# Patient Record
Sex: FEMALE | Race: WHITE
[De-identification: names, ages, dates, MRNs, and addresses within clinical notes are randomized per-mention and may not be internally consistent; named-entity substitution may affect disease eponyms.]

---

## 2019-03-25 ENCOUNTER — HOSPITAL ENCOUNTER (OUTPATIENT)
Dept: HOSPITAL 63 - US | Age: 24
Discharge: HOME | End: 2019-03-25
Attending: FAMILY MEDICINE
Payer: COMMERCIAL

## 2019-03-25 DIAGNOSIS — N61.0: Primary | ICD-10-CM

## 2019-03-25 DIAGNOSIS — N63.11: ICD-10-CM

## 2019-03-25 PROCEDURE — 76641 ULTRASOUND BREAST COMPLETE: CPT

## 2019-03-25 NOTE — RAD
Right breast ultrasound, 3/25/2019:



History: Mastitis, possible abscess



The right breast was carefully scanned.  There is vague decreased echogenicity

in the upper outer quadrant corresponding to the area of pain.  This extends

over an area measuring at least 3 cm.  No discrete fluid collection is seen to

suggest a drainable abscess.  



There is a 1.1 cm lobulated hypervascular nodule at the 9:00 location,

approximately 5 cm from the nipple, compatible with an intramammary lymph

node.



A 14 x 7 mm hypoechoic nodule in the right axilla is probably a reactive lymph

node.





IMPRESSION:

1.  Ill-defined hypoechoic process in the upper outer right breast compatible

with the clinical impression of mastitis.  No discrete abscess is seen.

2.  Small adjacent intramammary lymph node at the 9:00 location.

3.  Small right axillary lymph node which is probably reactive.

4.  Underlying neoplasm cannot be excluded.  Clinical and imaging follow-up is

suggested.

## 2022-01-11 ENCOUNTER — APPOINTMENT (RX ONLY)
Dept: URBAN - METROPOLITAN AREA CLINIC 139 | Facility: CLINIC | Age: 27
Setting detail: DERMATOLOGY
End: 2022-01-11

## 2022-01-11 DIAGNOSIS — L71.8 OTHER ROSACEA: ICD-10-CM

## 2022-01-11 DIAGNOSIS — B07.8 OTHER VIRAL WARTS: ICD-10-CM

## 2022-01-11 PROBLEM — D48.5 NEOPLASM OF UNCERTAIN BEHAVIOR OF SKIN: Status: ACTIVE | Noted: 2022-01-11

## 2022-01-11 PROCEDURE — ? BIOPSY BY SHAVE METHOD

## 2022-01-11 PROCEDURE — 99203 OFFICE O/P NEW LOW 30 MIN: CPT | Mod: 25

## 2022-01-11 PROCEDURE — ? COUNSELING

## 2022-01-11 PROCEDURE — ? PRESCRIPTION

## 2022-01-11 PROCEDURE — 11102 TANGNTL BX SKIN SINGLE LES: CPT

## 2022-01-11 RX ORDER — METRONIDAZOLE 7.5 MG/G
CREAM TOPICAL
Qty: 45 | Refills: 3 | Status: ERX | COMMUNITY
Start: 2022-01-11

## 2022-01-11 RX ADMIN — METRONIDAZOLE: 7.5 CREAM TOPICAL at 00:00

## 2022-01-11 ASSESSMENT — LOCATION DETAILED DESCRIPTION DERM
LOCATION DETAILED: RIGHT INFERIOR CENTRAL MALAR CHEEK
LOCATION DETAILED: RIGHT POSTERIOR LATERAL PROXIMAL THIGH

## 2022-01-11 ASSESSMENT — LOCATION ZONE DERM
LOCATION ZONE: LEG
LOCATION ZONE: FACE

## 2022-01-11 ASSESSMENT — LOCATION SIMPLE DESCRIPTION DERM
LOCATION SIMPLE: RIGHT THIGH
LOCATION SIMPLE: RIGHT CHEEK

## 2022-01-11 NOTE — PROCEDURE: BIOPSY BY SHAVE METHOD
Detail Level: Detailed
Depth Of Biopsy: dermis
Was A Bandage Applied: Yes
Size Of Lesion In Cm: 0.8
X Size Of Lesion In Cm: 0
Biopsy Type: H and E
Biopsy Method: Personna blade
Anesthesia Type: 2% lidocaine with epinephrine
Anesthesia Volume In Cc (Will Not Render If 0): 0.5
Hemostasis: Electrocautery and Aluminum Chloride
Wound Care: Vaseline
Dressing: pressure dressing
Destruction After The Procedure: No
Type Of Destruction Used: Curettage
Curettage Text: The wound bed was treated with curettage after the biopsy was performed.
Cryotherapy Text: The wound bed was treated with cryotherapy after the biopsy was performed.
Electrodesiccation Text: The wound bed was treated with electrodesiccation after the biopsy was performed.
Electrodesiccation And Curettage Text: The wound bed was treated with electrodesiccation and curettage after the biopsy was performed.
Silver Nitrate Text: The wound bed was treated with silver nitrate after the biopsy was performed.
Lab: [id number]
Lab Facility: [id number]
Post-Care Instructions: Instructions were given to patient. Patient to call if he has not heard results within 2 weeks.
Billing Type: Third-Party Bill
Information: Selecting Yes will display possible errors in your note based on the variables you have selected. This validation is only offered as a suggestion for you. PLEASE NOTE THAT THE VALIDATION TEXT WILL BE REMOVED WHEN YOU FINALIZE YOUR NOTE. IF YOU WANT TO FAX A PRELIMINARY NOTE YOU WILL NEED TO TOGGLE THIS TO 'NO' IF YOU DO NOT WANT IT IN YOUR FAXED NOTE.

## 2022-05-10 ENCOUNTER — APPOINTMENT (RX ONLY)
Dept: URBAN - METROPOLITAN AREA CLINIC 139 | Facility: CLINIC | Age: 27
Setting detail: DERMATOLOGY
End: 2022-05-10

## 2022-05-10 DIAGNOSIS — L71.8 OTHER ROSACEA: ICD-10-CM

## 2022-05-10 PROCEDURE — ? PRESCRIPTION

## 2022-05-10 PROCEDURE — 99213 OFFICE O/P EST LOW 20 MIN: CPT

## 2022-05-10 PROCEDURE — ? COUNSELING

## 2022-05-10 PROCEDURE — ? OTHER

## 2022-05-10 RX ORDER — OXYMETAZOLINE HYDROCHLORIDE 1 G/100G
CREAM TOPICAL QD
Qty: 30 | Refills: 2 | Status: ERX | COMMUNITY
Start: 2022-05-10

## 2022-05-10 RX ADMIN — OXYMETAZOLINE HYDROCHLORIDE: 1 CREAM TOPICAL at 00:00

## 2022-05-10 ASSESSMENT — LOCATION ZONE DERM
LOCATION ZONE: FACE
LOCATION ZONE: FACE

## 2022-05-10 ASSESSMENT — LOCATION SIMPLE DESCRIPTION DERM
LOCATION SIMPLE: RIGHT CHEEK
LOCATION SIMPLE: LEFT CHEEK
LOCATION SIMPLE: RIGHT CHEEK
LOCATION SIMPLE: LEFT CHEEK

## 2022-05-10 ASSESSMENT — LOCATION DETAILED DESCRIPTION DERM
LOCATION DETAILED: LEFT CENTRAL MALAR CHEEK
LOCATION DETAILED: RIGHT CENTRAL MALAR CHEEK
LOCATION DETAILED: RIGHT CENTRAL MALAR CHEEK
LOCATION DETAILED: LEFT INFERIOR CENTRAL MALAR CHEEK
LOCATION DETAILED: LEFT SUPERIOR CENTRAL BUCCAL CHEEK
LOCATION DETAILED: RIGHT SUPERIOR MEDIAL BUCCAL CHEEK
LOCATION DETAILED: LEFT SUPERIOR CENTRAL BUCCAL CHEEK
LOCATION DETAILED: RIGHT SUPERIOR MEDIAL BUCCAL CHEEK

## 2022-05-10 NOTE — PROCEDURE: OTHER
Detail Level: Zone
Note Text (......Xxx Chief Complaint.): This diagnosis correlates with the
Render Risk Assessment In Note?: no
Other (Free Text): Discussed antibiotics, prescriptions like rhofade or mervaso, and seeing our aestheticians Arely and Ministerio for laser treatment. Patient is breast feeding so rhofade is okay to use if not covered by insurance azelaic acid or Finacea. Patient given good RX card for rhofade if not covered by insurance.

## 2022-10-27 ENCOUNTER — APPOINTMENT (RX ONLY)
Dept: URBAN - METROPOLITAN AREA CLINIC 139 | Facility: CLINIC | Age: 27
Setting detail: DERMATOLOGY
End: 2022-10-27

## 2022-10-27 DIAGNOSIS — L71.8 OTHER ROSACEA: ICD-10-CM

## 2022-10-27 PROCEDURE — ? COUNSELING

## 2022-10-27 PROCEDURE — ? OTHER

## 2022-10-27 PROCEDURE — 99212 OFFICE O/P EST SF 10 MIN: CPT

## 2022-10-27 ASSESSMENT — LOCATION SIMPLE DESCRIPTION DERM
LOCATION SIMPLE: RIGHT CHEEK
LOCATION SIMPLE: LEFT CHEEK

## 2022-10-27 ASSESSMENT — LOCATION DETAILED DESCRIPTION DERM
LOCATION DETAILED: RIGHT INFERIOR CENTRAL MALAR CHEEK
LOCATION DETAILED: LEFT INFERIOR CENTRAL MALAR CHEEK

## 2022-10-27 ASSESSMENT — LOCATION ZONE DERM: LOCATION ZONE: FACE

## 2022-10-27 NOTE — PROCEDURE: OTHER
Other (Free Text): Patient elects to consult with Galen to discuss laser treatment.  Patient would also like to discuss blending of dark spots.
Detail Level: Zone
Render Risk Assessment In Note?: no
Note Text (......Xxx Chief Complaint.): This diagnosis correlates with the

## 2022-12-20 ENCOUNTER — APPOINTMENT (RX ONLY)
Dept: URBAN - METROPOLITAN AREA CLINIC 139 | Facility: CLINIC | Age: 27
Setting detail: DERMATOLOGY
End: 2022-12-20

## 2022-12-20 DIAGNOSIS — Z41.9 ENCOUNTER FOR PROCEDURE FOR PURPOSES OTHER THAN REMEDYING HEALTH STATE, UNSPECIFIED: ICD-10-CM

## 2022-12-20 PROCEDURE — ? PULSED-DYE LASER

## 2022-12-20 ASSESSMENT — LOCATION SIMPLE DESCRIPTION DERM
LOCATION SIMPLE: LEFT CHEEK
LOCATION SIMPLE: RIGHT CHEEK

## 2022-12-20 ASSESSMENT — LOCATION ZONE DERM: LOCATION ZONE: FACE

## 2022-12-20 ASSESSMENT — LOCATION DETAILED DESCRIPTION DERM
LOCATION DETAILED: LEFT CENTRAL MALAR CHEEK
LOCATION DETAILED: RIGHT CENTRAL MALAR CHEEK

## 2022-12-20 NOTE — PROCEDURE: PULSED-DYE LASER
Spot Size: 7 mm
Fluence In J/Cm2 (Optional): 6.5
Immediate Endpoint: purpura
Pulse Duration: .45 ms
Cryogen Time (Ms): 30
Laser Type: Vbeam 595nm
Fluence In J/Cm2 (Optional): 7
Price (Use Numbers Only, No Special Characters Or $): 955
Delay Time (Ms): 20
Detail Level: Detailed
Cryogen Time (Ms): 0
Pulse Duration: 10 ms
Consent: Written consent obtained, risks reviewed including but not limited to crusting, scabbing, blistering, scarring, darker or lighter pigmentary change, incidental hair removal, bruising, and/or incomplete removal.
Delay Time (Ms): 20
Location Override: face
Post-Procedure Care: Vaseline and ice applied. Post care reviewed with patient.
Post-Care Instructions: I reviewed with the patient in detail post-care instructions. Patient should stay away from the sun and wear sun protection until treated areas are fully healed.
Treated Area: small area
Cryogen Time (Ms): 30